# Patient Record
Sex: FEMALE | URBAN - METROPOLITAN AREA
[De-identification: names, ages, dates, MRNs, and addresses within clinical notes are randomized per-mention and may not be internally consistent; named-entity substitution may affect disease eponyms.]

---

## 2020-07-29 ENCOUNTER — OFFICE VISIT (OUTPATIENT)
Dept: URBAN - METROPOLITAN AREA CLINIC 33 | Facility: CLINIC | Age: 40
End: 2020-07-29

## 2020-08-04 ENCOUNTER — OFFICE VISIT (OUTPATIENT)
Dept: URBAN - METROPOLITAN AREA CLINIC 33 | Facility: CLINIC | Age: 40
End: 2020-08-04

## 2020-08-04 VITALS
TEMPERATURE: 97.2 F | BODY MASS INDEX: 27.49 KG/M2 | DIASTOLIC BLOOD PRESSURE: 80 MMHG | SYSTOLIC BLOOD PRESSURE: 110 MMHG | HEIGHT: 70 IN | WEIGHT: 192 LBS

## 2020-08-04 PROBLEM — 235595009: Status: ACTIVE | Noted: 2020-01-08

## 2020-08-04 RX ORDER — CYCLOBENZAPRINE HYDROCHLORIDE 5 MG/1
1 TABLET AS NEEDED TABLET, FILM COATED ORAL THREE TIMES A DAY
Status: ON HOLD | COMMUNITY

## 2020-08-04 RX ORDER — IBUPROFEN 200 MG/1
1 TABLET WITH FOOD OR MILK AS NEEDED TABLET, FILM COATED ORAL THREE TIMES A DAY
Status: ON HOLD | COMMUNITY

## 2020-08-04 RX ORDER — OMEGA-3S/DHA/EPA/FISH OIL 1000-1400
AS DIRECTED CAPSULE,DELAYED RELEASE (ENTERIC COATED) ORAL
Status: ACTIVE | COMMUNITY

## 2020-08-04 RX ORDER — DICYCLOMINE HYDROCHLORIDE 10 MG/1
1 CAPSULE CAPSULE ORAL
Qty: 30 | Refills: 0 | OUTPATIENT
Start: 2020-08-04

## 2020-08-04 RX ORDER — FLUCONAZOLE 150 MG/1
1 TABLET TABLET ORAL
Qty: 1 | Status: ON HOLD | COMMUNITY

## 2020-08-04 RX ORDER — ONDANSETRON 4 MG/1
1 TABLET ON THE TONGUE AND ALLOW TO DISSOLVE TABLET, ORALLY DISINTEGRATING ORAL ONCE A DAY
Qty: 30 | Status: ON HOLD | COMMUNITY

## 2020-08-04 NOTE — EXAM-MIGRATED EXAMINATIONS
GENERAL APPEARANCE: - alert, in no acute distress, well developed, well nourished;   HEAD: - normocephalic, atraumatic;   EYES: - sclera anicteric bilaterally;   SKIN: - warm and dry, no  icterus;   HEART: - no murmurs, regular rate and rhythm, S1, S2 normal;   LUNGS: - clear to auscultation bilaterally, good air movement, no wheezes, rales, rhonchi;   ABDOMEN: - bowel sounds present, no masses palpable, no organomegaly , no rebound tenderness, soft, tender RUQ,  nondistended;   EXTREMITIES: - no clubbing, cyanosis, or edema;   PSYCH: - cooperative with exam, mood/affect full range;

## 2020-08-06 ENCOUNTER — TELEPHONE ENCOUNTER (OUTPATIENT)
Dept: URBAN - METROPOLITAN AREA CLINIC 35 | Facility: CLINIC | Age: 40
End: 2020-08-06

## 2020-08-06 LAB
ABSOLUTE BASOPHILS: 42
ABSOLUTE EOSINOPHILS: 307
ABSOLUTE LYMPHOCYTES: 1674
ABSOLUTE MONOCYTES: 416
ABSOLUTE NEUTROPHILS: 2761
ALBUMIN/GLOBULIN RATIO: 1.8
ALBUMIN: 4.7
ALKALINE PHOSPHATASE: 37
ALT: 15
AMYLASE: 46
AST: 16
BASOPHILS: 0.8
BILIRUBIN, DIRECT: 0.1
BILIRUBIN, INDIRECT: 0.5
BILIRUBIN, TOTAL: 0.6
C-REACTIVE PROTEIN: 3.6
EOSINOPHILS: 5.9
GLOBULIN: 2.6
HEMATOCRIT: 44.9
HEMOGLOBIN: 15
LIPASE: 31
LYMPHOCYTES: 32.2
MCH: 29.8
MCHC: 33.4
MCV: 89.3
MONOCYTES: 8
MPV: 10.7
NEUTROPHILS: 53.1
PLATELET COUNT: 260
PROTEIN, TOTAL: 7.3
RDW: 12.7
RED BLOOD CELL COUNT: 5.03
SED RATE BY MODIFIED: 2
WHITE BLOOD CELL COUNT: 5.2

## 2020-08-18 ENCOUNTER — OFFICE VISIT (OUTPATIENT)
Dept: URBAN - METROPOLITAN AREA CLINIC 33 | Facility: CLINIC | Age: 40
End: 2020-08-18

## 2020-08-19 ENCOUNTER — OFFICE VISIT (OUTPATIENT)
Dept: URBAN - METROPOLITAN AREA CLINIC 33 | Facility: CLINIC | Age: 40
End: 2020-08-19

## 2020-08-19 VITALS
DIASTOLIC BLOOD PRESSURE: 78 MMHG | BODY MASS INDEX: 27.92 KG/M2 | HEIGHT: 70 IN | HEART RATE: 79 BPM | WEIGHT: 195 LBS | SYSTOLIC BLOOD PRESSURE: 110 MMHG | OXYGEN SATURATION: 98 %

## 2020-08-19 PROBLEM — 440630006: Status: ACTIVE | Noted: 2020-08-19

## 2020-08-19 RX ORDER — ONDANSETRON 4 MG/1
1 TABLET ON THE TONGUE AND ALLOW TO DISSOLVE TABLET, ORALLY DISINTEGRATING ORAL ONCE A DAY
Qty: 30 | Status: ON HOLD | COMMUNITY

## 2020-08-19 RX ORDER — FLUCONAZOLE 150 MG/1
1 TABLET TABLET ORAL
Qty: 1 | Status: ON HOLD | COMMUNITY

## 2020-08-19 RX ORDER — CYCLOBENZAPRINE HYDROCHLORIDE 5 MG/1
1 TABLET AS NEEDED TABLET, FILM COATED ORAL THREE TIMES A DAY
Status: ON HOLD | COMMUNITY

## 2020-08-19 RX ORDER — HYDROCORTISONE ACETATE 25 MG/1
1 SUPPOSITORY SUPPOSITORY RECTAL BID
Qty: 20 | Refills: 0 | OUTPATIENT
Start: 2020-08-19

## 2020-08-19 RX ORDER — OMEGA-3S/DHA/EPA/FISH OIL 1000-1400
AS DIRECTED CAPSULE,DELAYED RELEASE (ENTERIC COATED) ORAL
Status: ACTIVE | COMMUNITY

## 2020-08-19 RX ORDER — DICYCLOMINE HYDROCHLORIDE 10 MG/1
1 CAPSULE CAPSULE ORAL
Qty: 30 | Refills: 0 | Status: ACTIVE | COMMUNITY
Start: 2020-08-04

## 2020-08-19 RX ORDER — IBUPROFEN 200 MG/1
1 TABLET WITH FOOD OR MILK AS NEEDED TABLET, FILM COATED ORAL THREE TIMES A DAY
Status: ON HOLD | COMMUNITY

## 2020-08-19 NOTE — EXAM-MIGRATED EXAMINATIONS
GENERAL APPEARANCE: - alert, in no acute distress, well developed, well nourished;   HEAD: - normocephalic, atraumatic;   EYES: - sclera anicteric bilaterally;   SKIN: - warm and dry, no  icterus;   HEART: - no murmurs, regular rate and rhythm, S1, S2 normal;   LUNGS: - clear to auscultation bilaterally, good air movement, no wheezes, rales, rhonchi;   ABDOMEN: - bowel sounds present, no masses palpable, no organomegaly , no rebound tenderness, soft, mild tenderness epigastric area and right abdomen, nondistended;   EXTREMITIES: - no clubbing, cyanosis, or edema;   PSYCH: - cooperative with exam, mood/affect full range;

## 2020-08-19 NOTE — HPI-MIGRATED HPI
Back Pain : Patient admits any continued back pain since her last visit. She admits upper back pain mainly on her right side.    Last visit (8/4/2020) Patient presents today with complaints of back pain. Onset 2 weeks ago and the course has been persistent. She describes her pain as a deep internal pain in right upper/mid back, it radiates to the RUQ and occasionally goes to the LUQ. She admits intermittent nausea. No change in bowel habits.   Patient admits pain is constant but increased postprandial.   She had a fever 2 weeks ago due to sinus infection and was on antibiotics (Z pack and Doxycycline).  There is nausea but no emesis.  No J/DU/AS.  Patient admits some relief with Ibuprofen 200 mg x 2 BID.  3 BMs per day, with soft stool. No blood in stool or melena.  No recent imaging or labs have been collected.   On her last appointment I ordered a HIDA with GBEF but patient did not have it done as she was feeling better.   RUQ US done January 2020 showed a decompressed GB with some sludge. Normal biliary ducts.  ;   Acid Reflux : Patient denies any episodes of acid reflux. She denies taking any medication at this time.   She had one episode last week in which she vomited violently after eating ice cream.   Last visit (8/4/2020) Patient denies acid reflux at this time. She continues to do well with OTC meds such as Prilosec as needed.   EGD completed 01/17/2020 Notes: STOMACH, Body : Benign gastric mucosa with no significant histopathological abnormality. STOMACH, Antrum : Chronic gastritis, mild, nonspecific, with associated mild reactive/regenerative changes. No Helicobacter-like organisms identified. Negative for intestinal metaplasia and atrophy. DUODENUM, 2ND Portion : Benign small bowel mucosa with normal villous pattern. Negative for increased intraepithelial lymphocytes.     Last visit (1/29/2020) Patient presents today for follow up to her EGD. Since the procedure patient denies dysphagia, globus, changes in appetite, and changes in bowel habits.  Patient admits intermittent episodes of heartburn when she consumes fried food, or spicy foods. Her symptoms include burning in her esophagus, eructations, and occasionally nausea. She admits to taking Tums, or Prilosec for few days with relief of symptoms.   Patient does very well as long as she stays on antireflux diet, mostly avoiding luke food and avoiding overeating. There is no need for daily medication; she does OTC Meds for GERD like Prilosec just as needed.  EGD and path report as documented below.    Last visit (1/8/2020) Patient a longstanding hx of reflux. Her symptoms includes chest pain, gas/bloating, and burning.  She has frequent Heartburn. No dysphagia Occasional nausea. No emesis.  Patient admits taking Pepcid, Prilosec, and Tums intermittently.   Patient denies previous EGD, or barium swallow.   She takes Ibuprofen daily since surgery and for plantar fascitis;   Abdominal Pain : Patient comes for f/u appointment. She admits persistent daily abdominal pain. She admits RUQ pain that radiates down her side and to her back. She describes pain as a dull ache and occasional sharp and crampy. She reports that the pain will last for hours.   She admits 3-4 bowel movements a day with no strain. Her stools are formed with no blood present and occasional mucus. She admits when she has a bowel movement she feels like she has completely emptied her bowels. She also states that there are times in which she feels that she has to defecate and when she does there is only a small amount of pebble like stool that is evacuated. At that time she will use suppository. She admits the use of suppositories 1-2 times a week.   Patient reports that she will take Ginger Root or Ibuprofen. She admit that the pain feels that it is inflammation.   She c/o a constant bloated feeling.  She admits that she is nauseated 50% of the time after a bowel movement.    MD Note: Pain in RUQ, right abdomen and right back, most days is there and pain constant, worse if she lies down after eating. She feels bloated, pressure , full. She feels better after a BM and that is the reason she is using Glycerine suppositories. She states she sometimes feels constipated.  She was told to have IBS when she was 21 y/o.  HIDA scan 8/17/20: NL GB filling and NL biliary to bowel transit. GBEF 80%  Labs as documented below NL  Prior RUQ US: decompressed GB with sludge. No stones  EGD was also done with no significant findings.   Last visit (8/4/2020) Patient describes right mid back pain radiating to RUQ and occasionally to LUQ.  Last visit (1/29/2020) Patient admits improvement of abdominal pain. RUQ pain is better as long as she stays away from fatty foods. She still has the right mid chest on and off.  RUQ US as documented below.  Last visit (1/8/2020) 39 year old female presents today for consultation of abdominal pain. Patient recently underwent Laparoscopic excision of endometriosis, and appendectomy. Following the procedure she was informed of staining within the gallbladder suspicious for gallstones. No further workup has been completed.  Patient admits mid upper right back pain, pain related to food but also happens without food. Patient has lower abdominal pain as well occasional nausea.  Patient admits 1-2 BM's per day with normal stool. Patient denies melena, blood, or mucus in stool. She takes a daily probiotic and 2 fiber gummis every day. Patient has been told to have IBS in the past.  No J/DU/AS.   Labs 10/26/19: TSH NL,  Iron Sat 36%, CMP NL, CBC NL ;

## 2020-09-15 ENCOUNTER — TELEPHONE ENCOUNTER (OUTPATIENT)
Dept: URBAN - METROPOLITAN AREA CLINIC 35 | Facility: CLINIC | Age: 40
End: 2020-09-15

## 2020-09-16 ENCOUNTER — OFFICE VISIT (OUTPATIENT)
Dept: URBAN - METROPOLITAN AREA CLINIC 33 | Facility: CLINIC | Age: 40
End: 2020-09-16

## 2020-09-16 NOTE — HPI-MIGRATED HPI
Back Pain : Patient admits/denies continued back pain since her last visit.   Last visit (08/19/2020) Patient admits any continued back pain since her last visit. She admits upper back pain mainly on her right side.    Last visit (8/4/2020) Patient presents today with complaints of back pain. Onset 2 weeks ago and the course has been persistent. She describes her pain as a deep internal pain in right upper/mid back, it radiates to the RUQ and occasionally goes to the LUQ. She admits intermittent nausea. No change in bowel habits.   Patient admits pain is constant but increased postprandial.   She had a fever 2 weeks ago due to sinus infection and was on antibiotics (Z pack and Doxycycline).  There is nausea but no emesis.  No J/DU/AS.  Patient admits some relief with Ibuprofen 200 mg x 2 BID.  3 BMs per day, with soft stool. No blood in stool or melena.  No recent imaging or labs have been collected.   On her last appointment I ordered a HIDA with GBEF but patient did not have it done as she was feeling better.   RUQ US done January 2020 showed a decompressed GB with some sludge. Normal biliary ducts.  ;   Acid Reflux : Patient admits/denies any episodes of acid reflux. She admits/denies taking any medication at this time.   Last visit (08/19/2020) Patient denies any episodes of acid reflux. She denies taking any medication at this time.   She had one episode last week in which she vomited violently after eating ice cream.   Last visit (8/4/2020) Patient denies acid reflux at this time. She continues to do well with OTC meds such as Prilosec as needed.   EGD completed 01/17/2020 Notes: STOMACH, Body : Benign gastric mucosa with no significant histopathological abnormality. STOMACH, Antrum : Chronic gastritis, mild, nonspecific, with associated mild reactive/regenerative changes. No Helicobacter-like organisms identified. Negative for intestinal metaplasia and atrophy. DUODENUM, 2ND Portion : Benign small bowel mucosa with normal villous pattern. Negative for increased intraepithelial lymphocytes.     Last visit (1/29/2020) Patient presents today for follow up to her EGD. Since the procedure patient denies dysphagia, globus, changes in appetite, and changes in bowel habits.  Patient admits intermittent episodes of heartburn when she consumes fried food, or spicy foods. Her symptoms include burning in her esophagus, eructations, and occasionally nausea. She admits to taking Tums, or Prilosec for few days with relief of symptoms.   Patient does very well as long as she stays on antireflux diet, mostly avoiding luke food and avoiding overeating. There is no need for daily medication; she does OTC Meds for GERD like Prilosec just as needed.  EGD and path report as documented below.    Last visit (1/8/2020) Patient a longstanding hx of reflux. Her symptoms includes chest pain, gas/bloating, and burning.  She has frequent Heartburn. No dysphagia Occasional nausea. No emesis.  Patient admits taking Pepcid, Prilosec, and Tums intermittently.   Patient denies previous EGD, or barium swallow.   She takes Ibuprofen daily since surgery and for plantar fascitis;   Abdominal Pain : Patient presents today for a 4 week follow up.  Patient admits/denies any episodes of -- since his last office visit.  She admits starting Hydro-cortisone 25 BID with -- control for her hemorrhoids. She admits continued use of Zelnorm 6 mg, BID with -- control. She admits she has discontinued use of Probiotics and Bentyl.   She admits having CT of the Abdomen and Pelvis with results below.     Last visit (08/19/2020) Patient comes for f/u appointment. She admits persistent daily abdominal pain. She admits RUQ pain that radiates down her side and to her back. She describes pain as a dull ache and occasional sharp and crampy. She reports that the pain will last for hours.   She admits 3-4 bowel movements a day with no strain. Her stools are formed with no blood present and occasional mucus. She admits when she has a bowel movement she feels like she has completely emptied her bowels. She also states that there are times in which she feels that she has to defecate and when she does there is only a small amount of pebble like stool that is evacuated. At that time she will use suppository. She admits the use of suppositories 1-2 times a week.   Patient reports that she will take Ginger Root or Ibuprofen. She admit that the pain feels that it is inflammation.   She c/o a constant bloated feeling.  She admits that she is nauseated 50% of the time after a bowel movement.    MD Note: Pain in RUQ, right abdomen and right back, most days is there and pain constant, worse if she lies down after eating. She feels bloated, pressure , full. She feels better after a BM and that is the reason she is using Glycerine suppositories. She states she sometimes feels constipated.  She was told to have IBS when she was 19 y/o.  HIDA scan 8/17/20: NL GB filling and NL biliary to bowel transit. GBEF 80%  Labs as documented below NL  Prior RUQ US: decompressed GB with sludge. No stones  EGD was also done with no significant findings.   Last visit (8/4/2020) Patient describes right mid back pain radiating to RUQ and occasionally to LUQ.  Last visit (1/29/2020) Patient admits improvement of abdominal pain. RUQ pain is better as long as she stays away from fatty foods. She still has the right mid chest on and off.  RUQ US as documented below.  Last visit (1/8/2020) 39 year old female presents today for consultation of abdominal pain. Patient recently underwent Laparoscopic excision of endometriosis, and appendectomy. Following the procedure she was informed of staining within the gallbladder suspicious for gallstones. No further workup has been completed.  Patient admits mid upper right back pain, pain related to food but also happens without food. Patient has lower abdominal pain as well occasional nausea.  Patient admits 1-2 BM's per day with normal stool. Patient denies melena, blood, or mucus in stool. She takes a daily probiotic and 2 fiber gummis every day. Patient has been told to have IBS in the past.  No J/DU/AS.   Labs 10/26/19: TSH NL,  Iron Sat 36%, CMP NL, CBC NL ;

## 2021-01-04 ENCOUNTER — OFFICE VISIT (OUTPATIENT)
Dept: URBAN - METROPOLITAN AREA CLINIC 23 | Facility: CLINIC | Age: 41
End: 2021-01-04

## 2021-01-11 ENCOUNTER — OFFICE VISIT (OUTPATIENT)
Dept: URBAN - METROPOLITAN AREA CLINIC 23 | Facility: CLINIC | Age: 41
End: 2021-01-11

## 2021-01-26 ENCOUNTER — OFFICE VISIT (OUTPATIENT)
Dept: URBAN - METROPOLITAN AREA CLINIC 35 | Facility: CLINIC | Age: 41
End: 2021-01-26

## 2021-08-07 NOTE — HPI-MIGRATED HPI
Back Pain : Patient presents today with complaints of back pain. Onset 2 weeks ago and the course has been persistent. She describes her pain as a deep internal pain in right upper/mid back, it radiates to the RUQ and occasionally goes to the LUQ. She admits intermittent nausea. No change in bowel habits.   Patient admits pain is constant but increased postprandial.   She had a fever 2 weeks ago due to sinus infection and was on antibiotics (Z pack and Doxycycline).  There is nausea but no emesis.  No J/DU/AS.  Patient admits some relief with Ibuprofen 200 mg x 2 BID.  3 BMs per day, with soft stool. No blood in stool or melena.  No recent imaging or labs have been collected.   On her last appointment I ordered a HIDA with GBEF but patient did not have it done as she was feeling better.   RUQ US done January 2020 showed a decompressed GB with some sludge. Normal biliary ducts.  ;   Acid Reflux : Patient denies acid reflux at this time. She continues to do well with OTC meds such as Prilosec as needed.   EGD completed 01/17/2020 Notes: STOMACH, Body : Benign gastric mucosa with no significant histopathological abnormality. STOMACH, Antrum : Chronic gastritis, mild, nonspecific, with associated mild reactive/regenerative changes. No Helicobacter-like organisms identified. Negative for intestinal metaplasia and atrophy. DUODENUM, 2ND Portion : Benign small bowel mucosa with normal villous pattern. Negative for increased intraepithelial lymphocytes.     Last visit (1/29/2020) Patient presents today for follow up to her EGD. Since the procedure patient denies dysphagia, globus, changes in appetite, and changes in bowel habits.  Patient admits intermittent episodes of heartburn when she consumes fried food, or spicy foods. Her symptoms include burning in her esophagus, eructations, and occasionally nausea. She admits to taking Tums, or Prilosec for few days with relief of symptoms.   Patient does very well as long as she stays on antireflux diet, mostly avoiding luke food and avoiding overeating. There is no need for daily medication; she does OTC Meds for GERD like Prilosec just as needed.  EGD and path report as documented below.    Last visit (1/8/2020) Patient a longstanding hx of reflux. Her symptoms includes chest pain, gas/bloating, and burning.  She has frequent Heartburn. No dysphagia Occasional nausea. No emesis.  Patient admits taking Pepcid, Prilosec, and Tums intermittently.   Patient denies previous EGD, or barium swallow.   She takes Ibuprofen daily since surgery and for plantar fascitis;   Abdominal Pain : Patient describes righ mid back pain radiating to RUQ and occasionally to LUQ.  Last visit (1/29/2020) Patient admits improvement of abdominal pain. RUQ pain is better as long as she stays away from fatty foods. She still has the right mid chest on and off.  RUQ US as documented below.  Last visit (1/8/2020) 39 year old female presents today for consultation of abdominal pain. Patient recently underwent Laparoscopic excision of endometriosis, and appendectomy. Following the procedure she was informed of staining within the gallbladder suspicious for gallstones. No further workup has been completed.  Patient admits mid upper right back pain, pain related to food but also happens without food. Patient has lower abdominal pain as well occasional nausea.  Patient admits 1-2 BM's per day with normal stool. Patient denies melena, blood, or mucus in stool. She takes a daily probiotic and 2 fiber gummies every day. Patient has been told to have IBS in the past.  No J/DU/AS.   Labs 10/26/19: TSH NL,  Iron Sat 36%, CMP NL, CBC NL ;    Statement Selected